# Patient Record
Sex: MALE | Race: OTHER | URBAN - METROPOLITAN AREA
[De-identification: names, ages, dates, MRNs, and addresses within clinical notes are randomized per-mention and may not be internally consistent; named-entity substitution may affect disease eponyms.]

---

## 2018-05-07 PROBLEM — Z00.00 ENCOUNTER FOR PREVENTIVE HEALTH EXAMINATION: Status: ACTIVE | Noted: 2018-05-07

## 2019-03-08 ENCOUNTER — EMERGENCY (EMERGENCY)
Facility: HOSPITAL | Age: 39
LOS: 0 days | Discharge: HOME | End: 2019-03-08
Attending: EMERGENCY MEDICINE | Admitting: EMERGENCY MEDICINE
Payer: SELF-PAY

## 2019-03-08 VITALS
HEART RATE: 77 BPM | TEMPERATURE: 99 F | SYSTOLIC BLOOD PRESSURE: 107 MMHG | DIASTOLIC BLOOD PRESSURE: 63 MMHG | RESPIRATION RATE: 16 BRPM | OXYGEN SATURATION: 95 %

## 2019-03-08 VITALS — WEIGHT: 145.06 LBS

## 2019-03-08 DIAGNOSIS — R50.9 FEVER, UNSPECIFIED: ICD-10-CM

## 2019-03-08 DIAGNOSIS — J11.1 INFLUENZA DUE TO UNIDENTIFIED INFLUENZA VIRUS WITH OTHER RESPIRATORY MANIFESTATIONS: ICD-10-CM

## 2019-03-08 LAB
FLU A RESULT: DETECTED
FLU A RESULT: DETECTED
FLUAV AG NPH QL: DETECTED
FLUBV AG NPH QL: SIGNIFICANT CHANGE UP
RSV RESULT: SIGNIFICANT CHANGE UP
RSV RNA RESP QL NAA+PROBE: SIGNIFICANT CHANGE UP

## 2019-03-08 PROCEDURE — 71046 X-RAY EXAM CHEST 2 VIEWS: CPT | Mod: 26

## 2019-03-08 PROCEDURE — 99283 EMERGENCY DEPT VISIT LOW MDM: CPT | Mod: 25

## 2019-03-08 PROCEDURE — 99053 MED SERV 10PM-8AM 24 HR FAC: CPT

## 2019-03-08 RX ORDER — IBUPROFEN 200 MG
600 TABLET ORAL ONCE
Qty: 0 | Refills: 0 | Status: COMPLETED | OUTPATIENT
Start: 2019-03-08 | End: 2019-03-08

## 2019-03-08 RX ORDER — ACETAMINOPHEN 500 MG
650 TABLET ORAL ONCE
Qty: 0 | Refills: 0 | Status: COMPLETED | OUTPATIENT
Start: 2019-03-08 | End: 2019-03-08

## 2019-03-08 RX ADMIN — Medication 75 MILLIGRAM(S): at 04:04

## 2019-03-08 RX ADMIN — Medication 650 MILLIGRAM(S): at 02:04

## 2019-03-08 RX ADMIN — Medication 600 MILLIGRAM(S): at 04:04

## 2019-03-08 NOTE — ED PROVIDER NOTE - OBJECTIVE STATEMENT
37 yo male with no pmh c/o subjective fever, chills, cough, congestion, body aches x 2 days.  Did not take anything for fever or pain or symptoms.  No sick contacts.  no n/v/d.

## 2019-03-08 NOTE — ED PROVIDER NOTE - NSFOLLOWUPINSTRUCTIONS_ED_ALL_ED_FT
Follow up with your doctor this week  Alternate Tylenol 1 gram every 6 hours with Ibuprofen 600mg every 6 hours for pain and fever  Tamiflu twice daily x 5 days  Drink plenty of fluids  Return to ED for any further concerns

## 2019-03-08 NOTE — ED ADULT NURSE NOTE - OBJECTIVE STATEMENT
pt presents to ed c/o cough and fever x 2 days. pt states  he had 1 instance of SOB in the morning. pt's vss. 96% room air. 99.6 temp. pt denies taking any meds at home. pt ambulatory. pt talking in full sentences. pt in no distress

## 2021-01-17 ENCOUNTER — OUTPATIENT (OUTPATIENT)
Dept: OUTPATIENT SERVICES | Facility: HOSPITAL | Age: 41
LOS: 1 days | End: 2021-01-17
Payer: COMMERCIAL

## 2021-01-17 DIAGNOSIS — Z20.828 CONTACT WITH AND (SUSPECTED) EXPOSURE TO OTHER VIRAL COMMUNICABLE DISEASES: ICD-10-CM

## 2021-01-17 LAB — SARS-COV-2 RNA SPEC QL NAA+PROBE: SIGNIFICANT CHANGE UP

## 2021-01-17 PROCEDURE — C9803: CPT

## 2021-01-17 PROCEDURE — U0005: CPT

## 2021-01-17 PROCEDURE — U0003: CPT

## 2021-01-18 DIAGNOSIS — Z20.828 CONTACT WITH AND (SUSPECTED) EXPOSURE TO OTHER VIRAL COMMUNICABLE DISEASES: ICD-10-CM

## 2021-03-02 ENCOUNTER — APPOINTMENT (OUTPATIENT)
Dept: UROLOGY | Facility: CLINIC | Age: 41
End: 2021-03-02
Payer: COMMERCIAL

## 2021-03-02 VITALS
HEART RATE: 62 BPM | OXYGEN SATURATION: 98 % | TEMPERATURE: 98 F | WEIGHT: 145 LBS | HEIGHT: 64 IN | SYSTOLIC BLOOD PRESSURE: 119 MMHG | BODY MASS INDEX: 24.75 KG/M2 | DIASTOLIC BLOOD PRESSURE: 76 MMHG

## 2021-03-02 DIAGNOSIS — Z78.9 OTHER SPECIFIED HEALTH STATUS: ICD-10-CM

## 2021-03-02 PROCEDURE — 99203 OFFICE O/P NEW LOW 30 MIN: CPT

## 2021-03-02 PROCEDURE — 99072 ADDL SUPL MATRL&STAF TM PHE: CPT

## 2021-03-02 RX ORDER — DOXYCYCLINE HYCLATE 100 MG/1
100 TABLET ORAL
Qty: 20 | Refills: 0 | Status: ACTIVE | COMMUNITY
Start: 2021-03-02 | End: 1900-01-01

## 2021-03-03 PROBLEM — Z78.9 NO FAMILY HISTORY OF HYPERTENSION: Status: ACTIVE | Noted: 2021-03-03

## 2021-03-03 PROBLEM — Z78.9 NO PERTINENT PAST MEDICAL HISTORY: Status: RESOLVED | Noted: 2021-03-03 | Resolved: 2021-03-03

## 2021-03-03 NOTE — HISTORY OF PRESENT ILLNESS
[FreeTextEntry1] : This patient states that he has begun to notice some right orchialgia.  He also feels that his right testicle has increased in size from what he remembers earlier on.  He otherwise denies urinary tract symptoms at this time and has never had a urinary problem in the past.

## 2021-03-03 NOTE — END OF VISIT
[FreeTextEntry3] : I called in a course of antibiotics and labs ordered.  A sonogram is ordered of the scrotum and he will follow-up in 10 days

## 2021-03-03 NOTE — PHYSICAL EXAM
[General Appearance - Well Developed] : well developed [General Appearance - Well Nourished] : well nourished [Normal Appearance] : normal appearance [Well Groomed] : well groomed [General Appearance - In No Acute Distress] : no acute distress [Edema] : no peripheral edema [Respiration, Rhythm And Depth] : normal respiratory rhythm and effort [Exaggerated Use Of Accessory Muscles For Inspiration] : no accessory muscle use [Abdomen Soft] : soft [Abdomen Tenderness] : non-tender [Costovertebral Angle Tenderness] : no ~M costovertebral angle tenderness [Urethral Meatus] : meatus normal [Urinary Bladder Findings] : the bladder was normal on palpation [Scrotum] : the scrotum was normal [Testes Mass (___cm)] : there were no testicular masses [No Prostate Nodules] : no prostate nodules [FreeTextEntry1] : The prostate is palpably benign and small in size.  The right testicle is mildly tender and is larger than the left although on examination the right testicle is morphologically normal and the left seems mildly atrophic. [Normal Station and Gait] : the gait and station were normal for the patient's age [] : no rash [No Focal Deficits] : no focal deficits [Oriented To Time, Place, And Person] : oriented to person, place, and time [Affect] : the affect was normal [Mood] : the mood was normal [Not Anxious] : not anxious [No Palpable Adenopathy] : no palpable adenopathy

## 2021-03-08 ENCOUNTER — APPOINTMENT (OUTPATIENT)
Dept: ULTRASOUND IMAGING | Facility: CLINIC | Age: 41
End: 2021-03-08
Payer: COMMERCIAL

## 2021-03-08 ENCOUNTER — OUTPATIENT (OUTPATIENT)
Dept: OUTPATIENT SERVICES | Facility: HOSPITAL | Age: 41
LOS: 1 days | End: 2021-03-08
Payer: COMMERCIAL

## 2021-03-08 DIAGNOSIS — N50.819 TESTICULAR PAIN, UNSPECIFIED: ICD-10-CM

## 2021-03-08 PROCEDURE — 93975 VASCULAR STUDY: CPT | Mod: 26

## 2021-03-08 PROCEDURE — 93975 VASCULAR STUDY: CPT

## 2021-03-16 ENCOUNTER — APPOINTMENT (OUTPATIENT)
Dept: UROLOGY | Facility: CLINIC | Age: 41
End: 2021-03-16

## 2021-10-25 ENCOUNTER — EMERGENCY (EMERGENCY)
Facility: HOSPITAL | Age: 41
LOS: 0 days | Discharge: HOME | End: 2021-10-25
Attending: EMERGENCY MEDICINE
Payer: COMMERCIAL

## 2021-10-25 VITALS
RESPIRATION RATE: 17 BRPM | HEART RATE: 68 BPM | DIASTOLIC BLOOD PRESSURE: 86 MMHG | OXYGEN SATURATION: 98 % | SYSTOLIC BLOOD PRESSURE: 137 MMHG | WEIGHT: 164.91 LBS | HEIGHT: 67 IN | TEMPERATURE: 98 F

## 2021-10-25 DIAGNOSIS — H93.11 TINNITUS, RIGHT EAR: ICD-10-CM

## 2021-10-25 DIAGNOSIS — H91.91 UNSPECIFIED HEARING LOSS, RIGHT EAR: ICD-10-CM

## 2021-10-25 PROCEDURE — 99282 EMERGENCY DEPT VISIT SF MDM: CPT

## 2021-10-25 NOTE — ED ADULT TRIAGE NOTE - CHIEF COMPLAINT QUOTE
Pt. to the ED C/O Right Ear Buzzing and Decreased Hearing since this morning- Pt. states he hear popping sound -- Pt. 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 denies Trauma

## 2021-10-25 NOTE — ED STATDOCS - NSFOLLOWUPINSTRUCTIONS_ED_ALL_ED_FT
Tinnitus    Tinnitus      El término tinnitus hace referencia a la percepción de un rey que no se corresponde con ninguna jarrett real para prince rey. A menudo se lo describe ernie zumbido de oídos. Sin embargo, las personas que sufren esta afección pueden percibir diferentes ruidos, en rad o ambos oídos.    Los sonidos del tinnitus pueden ser suaves, dylan o de intensidad intermedia. El tinnitus puede prolongarse pocos segundos o ser tuan juve varios días. Puede desaparecer sin tratamiento y regresar en distintos momentos. Cuando el tinnitus es permanente u ocurre con frecuencia, puede causar otros problemas, por ejemplo, dificultad para dormir y para concentrarse.    Serena todas las personas tienen tinnitus en algún momento. El tinnitus a dasia plazo (crónico) o que regresa con frecuencia (recurrente) es un problema que puede requerir atención médica.      ¿Cuáles son las causas?  A menudo se desconoce la causa del tinnitus. En algunos casos, puede ser consecuencia de lo siguiente:  •Exposición a ruidos dylan de maquinarias, música u otras babcock.      •Un objeto (cuerpo extraño) atascado en el oído.      •Acumulación de cerumen.      •El consumo de alcohol o cafeína.      •Navid ciertos medicamentos.      •La pérdida auditiva relacionada con la edad.    También puede ser causada por afecciones médicas tales ernie:  •Infecciones de los oídos o de los senos paranasales.      •Presión arterial eyal.      •Enfermedades cardíacas.      •Anemia.      •Alergias.      •Enfermedad de Meniere.      •Problemas de tiroides.      •Tumores.      •Un vaso sanguíneo débil o abultado (aneurisma) cerca del oído.        ¿Cuáles son los signos o síntomas?  El principal síntoma de tinnitus es la percepción de un rey que no se corresponde con ninguna jarrett, no proviene de ningún lugar. El rey puede percibirse ernie lo siguiente:  •Vibración.      •Crepitación.      •Zumbido.      •Soplido de aire.      •Sibilancia.      •Silbido.      •Chisporroteo.      •Runrún.      •Jose corriente de agua.      •Jose nota musical.      •Golpecitos.      Es posible que los síntomas afecten un solo oído (unilateral) o ambos oídos (bilateral).      ¿Cómo se diagnostica?  El tinnitus se diagnostica en función de los síntomas, antecedentes médicos y un examen físico. El médico puede realizar jose prueba auditiva exhaustiva (examen de audición) si el tinnitus:  •Es unilateral.      •Causa dificultades auditivas.      •Dura más de 6 meses.    Usted puede trabajar con un médico especialista en trastornos auditivos (audiólogo). Le pueden hacer preguntas sobre sondra síntomas y cómo estos afectan [place] lanette diaria. Es posible que le bee algunas pruebas, por ejemplo:  •Exploración por tomografía computarizada (TC).      •Resonancia magnética (RM).      •Un estudio de diagnóstico por imágenes de cómo fluye la naima a través de los vasos sanguíneos (angiograma).        ¿Cómo se trata?  A veces, el tratamiento de jose enfermedad preexistente hace que el tinnitus desaparezca. Si el tinnitus continúa, probablemente debe realizar alguno de los siguientes tratamientos, entre otros:  •Medicamentos.      •Terapia y orientación para ayudarlo a controlar el estrés que significa vivir con tinnitus.    •Generadores de rey para enmascarar el tinnitus. [place] incluye lo siguiente:  •Aparatos de rey de hayes que reproducen sonidos relajantes para ayudarlo a dormir.      •Dispositivos inteligentes que se adaptan al oído y reproducen sonidos o música.      •Estimulación acústica neuronal. [place] implica usar auriculares para escuchar música que contiene jose señal auditiva. Con el tiempo, escuchar esta señal puede modificar las redes del cerebro y reducir la sensibilidad al tinnitus. Yulia tratamiento se usa en casos muy graves cuando ningún otro tratamiento resulta eficaz.        •El uso de audífonos o implantes cocleares, si el tinnitus guarda relación con la pérdida de la audición. Los audífonos se usan en el oído externo. Implantes cocleares se colocan quirúrgicamente en el oído interno.        Siga estas instrucciones en [place] casa:      Controlar los síntomas                   •Cuando sea posible, no permanezca en lugares ruidosos y no se exponga a sonidos dylan.      •Use dispositivos de protección de la audición, por ejemplo, tapones, cuando esté expuesto a ruidos dylan.      •Use un aparato de rey de fondo, un humidificador u otros dispositivos para enmascarar el rey del tinnitus.      •Ponga en práctica técnicas para reducir el estrés, ernie meditación, yoga o respiración profunda. Trabaje con el médico si necesita ayuda para manejar el estrés.      •Duerma con la adin levemente elevada. [place] puede reducir el impacto del tinnitus.      Instrucciones generales     • No use sustancias estimulantes, ernie nicotina, alcohol o cafeína. Hable con el médico sobre otros estimulantes que deba evitar. Los estimulantes son sustancias que pueden hacer que se sienta alerta y atento al aumentar determinadas actividades en el cuerpo (por ejemplo, la frecuencia cardíaca y la presión arterial). Estas sustancias pueden empeorar el tinnitus.      •Use los medicamentos de venta khushboo y los recetados solamente ernie se lo haya indicado el médico.      •Intente dormir mucho todas las noches.      •Concurra a todas las visitas de seguimiento ernie se lo haya indicado el médico. [place] es importante.        Comuníquese con un médico si:    •El tinnitus se prolonga juve 3 semanas o más tiempo y no se detiene.      •Pierde la audición de forma repentina.      •Los síntomas empeoran o no mejoran con los cuidados en el hogar.      •Siente que no puede controlar el estrés que le provoca vivir con tinnitus.        Solicite ayuda de inmediato si:    •Experimenta tinnitus después de sufrir jose lesión en la adin.    •Tiene tinnitus junto con alguno de estos síntomas:  •Mareos.      •Pérdida del equilibrio.      •Náuseas y vómitos.      •Dolor de adin repentino e intenso.        Estos síntomas pueden representar un problema grave que constituye jose emergencia. No espere a zora si los síntomas desaparecen. Solicite atención médica de inmediato. Comuníquese con el servicio de emergencias de [place] localidad (911 en los [place]). No conduzca por sondra propios medios hasta el hospital.       Resumen    •El término tinnitus hace referencia a la percepción de un rey que no se corresponde con ninguna jarrett real para prince rey. A menudo se lo describe ernie zumbido de oídos.      •Es posible que los síntomas afecten un solo oído (unilateral) o ambos oídos (bilateral).      •Use un aparato de rey de fondo, un humidificador u otros dispositivos para enmascarar el rey del tinnitus.      • No use sustancias estimulantes, ernie nicotina, alcohol o cafeína. Hable con el médico sobre otros estimulantes que deba evitar. Estas sustancias pueden empeorar el tinnitus.      Esta información no tiene ernie fin reemplazar el consejo del médico. Asegúrese de hacerle al médico cualquier pregunta que tenga.

## 2021-10-25 NOTE — ED STATDOCS - ENMT, MLM
Nasal mucosa clear.  Mouth with normal mucosa. TM normal b/l. Throat has no vesicles, no oropharyngeal exudates and uvula is midline.

## 2021-10-25 NOTE — ED ADULT NURSE NOTE - OBJECTIVE STATEMENT
pt presents to ED with complaints of ear ringing. pt reports symptoms started this am. hx of deafness since childhood.

## 2021-10-25 NOTE — ED STATDOCS - OBJECTIVE STATEMENT
42 y/o male with no pertinent PMHx, presents to the ED c/o R ear ringing. States he has muffled/decrease hearing from R ear when he was driving to work today. No trauma or injury. No other complaints.

## 2021-10-25 NOTE — ED STATDOCS - PATIENT PORTAL LINK FT
You can access the FollowMyHealth Patient Portal offered by [facility] by registering at the following website: [email or web address]. By joining [employer]’s FollowMyHealth portal, you will also be able to view your health information using other applications (apps) compatible with our system.

## 2021-10-25 NOTE — ED STATDOCS - CARE PROVIDER_API CALL
Edi Munoz)  Otolaryngology  [address] [place] [address]  Phone: [phone]  Fax: [phone]  Follow Up Time:

## 2021-10-25 NOTE — ED STATDOCS - CLINICAL SUMMARY MEDICAL DECISION MAKING FREE TEXT BOX
pt with decrease/muffled hearing R ear, need f/u with ENT, no indication for further w/u, will dc with return precaution

## 2021-10-25 NOTE — ED STATDOCS - NSICDXNOPASTSURGICALHX_GEN_ALL_ED
Left message advising patient that we do not have any available appointments to see him and he should go to an urgent care facility to be evaluated per Dr Elbert Dupree. <-- Click to add NO significant Past Surgical History

## 2021-10-26 ENCOUNTER — TRANSCRIPTION ENCOUNTER (OUTPATIENT)
Age: 41
End: 2021-10-26

## 2021-10-26 ENCOUNTER — APPOINTMENT (OUTPATIENT)
Dept: OTOLARYNGOLOGY | Facility: CLINIC | Age: 41
End: 2021-10-26
Payer: COMMERCIAL

## 2021-10-26 VITALS
WEIGHT: 145 LBS | HEIGHT: 64 IN | BODY MASS INDEX: 24.75 KG/M2 | HEART RATE: 62 BPM | SYSTOLIC BLOOD PRESSURE: 119 MMHG | TEMPERATURE: 98.4 F | DIASTOLIC BLOOD PRESSURE: 76 MMHG

## 2021-10-26 PROCEDURE — 92553 AUDIOMETRY AIR & BONE: CPT

## 2021-10-26 PROCEDURE — T1013: CPT

## 2021-10-26 PROCEDURE — 99204 OFFICE O/P NEW MOD 45 MIN: CPT

## 2021-10-26 PROCEDURE — 92567 TYMPANOMETRY: CPT

## 2021-10-26 RX ORDER — PREDNISONE 10 MG/1
10 TABLET ORAL
Qty: 45 | Refills: 0 | Status: ACTIVE | COMMUNITY
Start: 2021-10-26 | End: 1900-01-01

## 2021-10-26 NOTE — REASON FOR VISIT
[Initial Consultation] : an initial consultation for [Spouse] : spouse [Time Spent: ____ minutes] : Total time spent using  services: [unfilled] minutes. The patient's primary language is not English thus required  services. [FreeTextEntry2] : left ear hearing loss, right ear noises [Interpreters_IDNumber] : [id number] [Interpreters_FullName] : Evelyne [TWNoteComboBox1] : Chinese

## 2021-10-26 NOTE — ASSESSMENT
[FreeTextEntry1] : Patient with many year hx of hearing loss in left ear and recent onset of hearing loss in right  ear .  No hx of prior ear procedures.  No other symptoms.  Ear exam normal however audio does show profound hearing loss in left ear and severe to profound snhl in right ear.  Recommened start oral prednisone but not optimistic about result.  Will get MRI and will also have patient follow up with Dr Benítez for ? of IT therapy and also may need to consider CI in future pending results of treatment and imaging.

## 2021-10-26 NOTE — HISTORY OF PRESENT ILLNESS
[de-identified] : c/o difficulty hearing.  More difficulty in left ear.  Problem since child.  Also has some problems in right ear and hears noises.  Right ear problem started yesterday.  No problems prior.  No hx of ear surgery.  Had freq infections as child.  ? no hx of ear drainage.  Did have hearing in right ear prior to yesterday.

## 2021-10-26 NOTE — DATA REVIEWED
[de-identified] : Pt speaks only Greenlandic\par Left ear profound SNHL\par Right ear severe to profound SNHL-sudden onset?\par Type A tymps\par

## 2021-11-01 ENCOUNTER — APPOINTMENT (OUTPATIENT)
Dept: OTOLARYNGOLOGY | Facility: CLINIC | Age: 41
End: 2021-11-01
Payer: COMMERCIAL

## 2021-11-01 VITALS
HEART RATE: 60 BPM | DIASTOLIC BLOOD PRESSURE: 73 MMHG | BODY MASS INDEX: 24.75 KG/M2 | SYSTOLIC BLOOD PRESSURE: 120 MMHG | WEIGHT: 145 LBS | HEIGHT: 64 IN

## 2021-11-01 PROCEDURE — 99214 OFFICE O/P EST MOD 30 MIN: CPT | Mod: 25

## 2021-11-01 PROCEDURE — 69801 INCISE INNER EAR: CPT

## 2021-11-01 PROCEDURE — 92567 TYMPANOMETRY: CPT

## 2021-11-01 PROCEDURE — 92557 COMPREHENSIVE HEARING TEST: CPT

## 2021-11-01 RX ORDER — PREDNISONE 10 MG/1
10 TABLET ORAL
Qty: 42 | Refills: 0 | Status: ACTIVE | COMMUNITY
Start: 2021-11-01 | End: 1900-01-01

## 2021-11-01 NOTE — PROCEDURE
[FreeTextEntry3] : DEXAMETHASONE 10mg/ml injected in R ear\par \par Procedure note: Right intratympanic steroid injection.\par \par Description of Operative Procedure:  Risks, benefits, and alternatives of the plan and procedure were discussed with the patient prior to proceeding.  Risks would include but are not limited to bleeding, infection, persistent pain, persistent drainage, dizziness, or failure to improve hearing.  Benefits would include improvement in hearing loss.  The patient agreed to proceed.  Topical phenol was used to anesthetize the eardrum.  Using a long #25 gauge needle, 0.6 cc of dexamethasone 10 mg/ml was injected into the middle ear space.  The patient remained in the supine position for 15 minutes and tolerated the procedure without complications.

## 2021-11-01 NOTE — DATA REVIEWED
[de-identified] : Type A, AU\par Right: severe snhl 250-8khz\par Left: Profound hl 250-8khz\par Recs: 1) ENT f/u 2) re-eval as per md 3) further rec's pending above

## 2021-11-01 NOTE — REASON FOR VISIT
[Subsequent Evaluation] : a subsequent evaluation for [Hearing Loss] : hearing loss [FreeTextEntry2] : ear noise

## 2021-11-05 ENCOUNTER — APPOINTMENT (OUTPATIENT)
Dept: MRI IMAGING | Facility: CLINIC | Age: 41
End: 2021-11-05

## 2021-11-05 ENCOUNTER — OUTPATIENT (OUTPATIENT)
Dept: OUTPATIENT SERVICES | Facility: HOSPITAL | Age: 41
LOS: 1 days | End: 2021-11-05

## 2021-11-05 DIAGNOSIS — Z00.8 ENCOUNTER FOR OTHER GENERAL EXAMINATION: ICD-10-CM

## 2021-11-08 ENCOUNTER — APPOINTMENT (OUTPATIENT)
Dept: OTOLARYNGOLOGY | Facility: CLINIC | Age: 41
End: 2021-11-08
Payer: COMMERCIAL

## 2021-11-08 VITALS
BODY MASS INDEX: 24.75 KG/M2 | HEIGHT: 64 IN | DIASTOLIC BLOOD PRESSURE: 76 MMHG | HEART RATE: 76 BPM | WEIGHT: 145 LBS | SYSTOLIC BLOOD PRESSURE: 144 MMHG

## 2021-11-08 PROCEDURE — 99214 OFFICE O/P EST MOD 30 MIN: CPT

## 2021-11-08 PROCEDURE — 99213 OFFICE O/P EST LOW 20 MIN: CPT | Mod: 25

## 2021-11-08 PROCEDURE — 92557 COMPREHENSIVE HEARING TEST: CPT

## 2021-11-08 PROCEDURE — 92567 TYMPANOMETRY: CPT

## 2021-11-08 NOTE — DATA REVIEWED
[de-identified] : type A tymps AU\par ad: severe SNHL 250-8000 Hz\par as: profound -8000 Hz\par 1) ENT F/U 2) re-eval as per MD 3) further recs pending

## 2021-11-08 NOTE — REASON FOR VISIT
[Subsequent Evaluation] : a subsequent evaluation for [FreeTextEntry2] : Patient here to follow up on Hearing loss

## 2021-11-10 ENCOUNTER — APPOINTMENT (OUTPATIENT)
Dept: MRI IMAGING | Facility: CLINIC | Age: 41
End: 2021-11-10
Payer: COMMERCIAL

## 2021-11-10 ENCOUNTER — OUTPATIENT (OUTPATIENT)
Dept: OUTPATIENT SERVICES | Facility: HOSPITAL | Age: 41
LOS: 1 days | End: 2021-11-10

## 2021-11-10 DIAGNOSIS — H90.A21 SENSORINEURAL HEARING LOSS, UNILATERAL, RIGHT EAR, WITH RESTRICTED HEARING ON THE CONTRALATERAL SIDE: ICD-10-CM

## 2021-11-10 PROCEDURE — 70553 MRI BRAIN STEM W/O & W/DYE: CPT | Mod: 26

## 2021-11-15 ENCOUNTER — NON-APPOINTMENT (OUTPATIENT)
Age: 41
End: 2021-11-15

## 2021-11-24 ENCOUNTER — APPOINTMENT (OUTPATIENT)
Dept: OTOLARYNGOLOGY | Facility: CLINIC | Age: 41
End: 2021-11-24
Payer: COMMERCIAL

## 2021-11-24 VITALS
DIASTOLIC BLOOD PRESSURE: 80 MMHG | HEART RATE: 73 BPM | HEIGHT: 64 IN | BODY MASS INDEX: 24.75 KG/M2 | WEIGHT: 145 LBS | SYSTOLIC BLOOD PRESSURE: 135 MMHG

## 2021-11-24 DIAGNOSIS — H90.A21 SENSORINEURAL HEARING LOSS, UNILATERAL, RIGHT EAR, WITH RESTRICTED HEARING ON THE CONTRALATERAL SIDE: ICD-10-CM

## 2021-11-24 PROCEDURE — 99213 OFFICE O/P EST LOW 20 MIN: CPT

## 2021-11-24 PROCEDURE — 92550 TYMPANOMETRY & REFLEX THRESH: CPT

## 2021-11-24 PROCEDURE — 92557 COMPREHENSIVE HEARING TEST: CPT

## 2021-11-24 NOTE — REASON FOR VISIT
[Subsequent Evaluation] : a subsequent evaluation for [FreeTextEntry2] : Patient here to follow up after MRI

## 2021-11-24 NOTE — DATA REVIEWED
[de-identified] : *significant improvement in hearing and SRS since 11/8/21\par Type A tymps AU\par AD: mild to mod-severe SNHL, 250-8000 Hz\par AS: profound SNHL, 250-8000 Hz\par REC: ENT f/u, re-eval per MD, further recs pending\par

## 2022-02-28 ENCOUNTER — APPOINTMENT (OUTPATIENT)
Dept: OTOLARYNGOLOGY | Facility: CLINIC | Age: 42
End: 2022-02-28
Payer: COMMERCIAL

## 2022-02-28 VITALS
HEIGHT: 60 IN | BODY MASS INDEX: 30.43 KG/M2 | HEART RATE: 53 BPM | WEIGHT: 155 LBS | SYSTOLIC BLOOD PRESSURE: 118 MMHG | DIASTOLIC BLOOD PRESSURE: 76 MMHG

## 2022-02-28 DIAGNOSIS — H90.3 SENSORINEURAL HEARING LOSS, BILATERAL: ICD-10-CM

## 2022-02-28 DIAGNOSIS — H93.11 TINNITUS, RIGHT EAR: ICD-10-CM

## 2022-02-28 DIAGNOSIS — H93.291 OTHER ABNORMAL AUDITORY PERCEPTIONS, RIGHT EAR: ICD-10-CM

## 2022-02-28 DIAGNOSIS — H91.21 SUDDEN IDIOPATHIC HEARING LOSS, RIGHT EAR: ICD-10-CM

## 2022-02-28 PROCEDURE — 99213 OFFICE O/P EST LOW 20 MIN: CPT

## 2022-02-28 PROCEDURE — 92557 COMPREHENSIVE HEARING TEST: CPT

## 2022-02-28 PROCEDURE — 92567 TYMPANOMETRY: CPT

## 2022-02-28 NOTE — DATA REVIEWED
[de-identified] : I personally ordered and reviewed an audiogram for the patient's abnormal auditory perception, which shows\par - type c tymp in right, type A in left\par right: borderline normal sloping to an ess moderate snhl\par left: profound snhl\par improvement in right thresholds 250-2000hz and WRS re: 11/24/21 audio \par rec: 1) ent f/u 2) re-eval as per md 3) consider hearing aid for right ear

## 2022-02-28 NOTE — REASON FOR VISIT
[Subsequent Evaluation] : a subsequent evaluation for [FreeTextEntry2] : RT ear follow up/Ringing in the ear [Interpreters_IDNumber] : [id number] [TWNoteComboBox1] : Guatemalan

## 2022-02-28 NOTE — HISTORY OF PRESENT ILLNESS
[de-identified] : 40 y/o M presents for f/u for hearing loss\par patient reports hearing improving since last visit\par also reports tinnitus bilaterally

## 2023-02-02 ENCOUNTER — APPOINTMENT (OUTPATIENT)
Dept: UROLOGY | Facility: CLINIC | Age: 43
End: 2023-02-02
Payer: MEDICAID

## 2023-02-02 VITALS
BODY MASS INDEX: 24.75 KG/M2 | HEART RATE: 68 BPM | SYSTOLIC BLOOD PRESSURE: 135 MMHG | HEIGHT: 64 IN | WEIGHT: 145 LBS | OXYGEN SATURATION: 98 % | DIASTOLIC BLOOD PRESSURE: 97 MMHG | RESPIRATION RATE: 14 BRPM | TEMPERATURE: 97.9 F

## 2023-02-02 DIAGNOSIS — N50.819 TESTICULAR PAIN, UNSPECIFIED: ICD-10-CM

## 2023-02-02 PROCEDURE — 99214 OFFICE O/P EST MOD 30 MIN: CPT

## 2023-02-03 LAB
APPEARANCE: CLEAR
BACTERIA: NEGATIVE
BILIRUBIN URINE: NEGATIVE
BLOOD URINE: NEGATIVE
COLOR: COLORLESS
GLUCOSE QUALITATIVE U: NEGATIVE
HYALINE CASTS: 0 /LPF
KETONES URINE: NEGATIVE
LEUKOCYTE ESTERASE URINE: NEGATIVE
MICROSCOPIC-UA: NORMAL
NITRITE URINE: NEGATIVE
PH URINE: 7
PROTEIN URINE: NEGATIVE
PSA SERPL-MCNC: 0.66 NG/ML
RED BLOOD CELLS URINE: 0 /HPF
SPECIFIC GRAVITY URINE: 1.01
SQUAMOUS EPITHELIAL CELLS: 0 /HPF
UROBILINOGEN URINE: NORMAL
WHITE BLOOD CELLS URINE: 0 /HPF

## 2023-02-03 NOTE — HISTORY OF PRESENT ILLNESS
[FreeTextEntry1] : 42M presents today with a CC of voiding issues. He notes urinary frequency and occasional mild dysuria.

## 2023-02-03 NOTE — END OF VISIT
[FreeTextEntry3] : Labs are ordered. I ordered a course of Doxycycline. He will follow up with a TRUSP in 3 weeks.

## 2023-02-04 LAB — BACTERIA UR CULT: NORMAL

## 2023-02-06 LAB — URINE CYTOLOGY: NORMAL

## 2023-03-14 ENCOUNTER — APPOINTMENT (OUTPATIENT)
Dept: UROLOGY | Facility: CLINIC | Age: 43
End: 2023-03-14
Payer: MEDICAID

## 2023-03-14 VITALS
WEIGHT: 145 LBS | OXYGEN SATURATION: 99 % | RESPIRATION RATE: 16 BRPM | DIASTOLIC BLOOD PRESSURE: 73 MMHG | SYSTOLIC BLOOD PRESSURE: 116 MMHG | HEIGHT: 64 IN | HEART RATE: 63 BPM | BODY MASS INDEX: 24.75 KG/M2

## 2023-03-14 DIAGNOSIS — N40.1 BENIGN PROSTATIC HYPERPLASIA WITH LOWER URINARY TRACT SYMPMS: ICD-10-CM

## 2023-03-14 DIAGNOSIS — N13.8 BENIGN PROSTATIC HYPERPLASIA WITH LOWER URINARY TRACT SYMPMS: ICD-10-CM

## 2023-03-14 DIAGNOSIS — N40.0 BENIGN PROSTATIC HYPERPLASIA WITHOUT LOWER URINARY TRACT SYMPMS: ICD-10-CM

## 2023-03-14 PROCEDURE — 76872 US TRANSRECTAL: CPT

## 2023-03-14 PROCEDURE — 76857 US EXAM PELVIC LIMITED: CPT

## 2023-03-14 RX ORDER — TAMSULOSIN HYDROCHLORIDE 0.4 MG/1
0.4 CAPSULE ORAL
Qty: 30 | Refills: 11 | Status: ACTIVE | COMMUNITY
Start: 2023-03-14 | End: 1900-01-01

## 2023-08-29 NOTE — ED ADULT NURSE NOTE - NSHISCREENINGQ1_ED_A_ED
HR=77 bpm, HDZR=647/66 mmhg, SpO2=94.0 %, Resp=16 B/min, EtCO2=31 mmHg, Apnea=3 Seconds, Pain=0, Mack=2, Comment=NSR No